# Patient Record
Sex: FEMALE | Race: WHITE | URBAN - METROPOLITAN AREA
[De-identification: names, ages, dates, MRNs, and addresses within clinical notes are randomized per-mention and may not be internally consistent; named-entity substitution may affect disease eponyms.]

---
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
## 2022-04-10 ENCOUNTER — HISTORICAL (OUTPATIENT)
Dept: ADMINISTRATIVE | Facility: HOSPITAL | Age: 22
End: 2022-04-10

## 2022-04-27 VITALS
HEIGHT: 65 IN | OXYGEN SATURATION: 100 % | BODY MASS INDEX: 18.66 KG/M2 | WEIGHT: 112 LBS | DIASTOLIC BLOOD PRESSURE: 76 MMHG | SYSTOLIC BLOOD PRESSURE: 113 MMHG

## 2022-09-16 ENCOUNTER — HISTORICAL (OUTPATIENT)
Dept: ADMINISTRATIVE | Facility: HOSPITAL | Age: 22
End: 2022-09-16

## 2024-12-23 ENCOUNTER — OFFICE VISIT (OUTPATIENT)
Dept: URGENT CARE | Facility: CLINIC | Age: 24
End: 2024-12-23
Payer: COMMERCIAL

## 2024-12-23 VITALS
DIASTOLIC BLOOD PRESSURE: 84 MMHG | WEIGHT: 120 LBS | OXYGEN SATURATION: 98 % | HEIGHT: 66 IN | TEMPERATURE: 99 F | HEART RATE: 104 BPM | RESPIRATION RATE: 17 BRPM | BODY MASS INDEX: 19.29 KG/M2 | SYSTOLIC BLOOD PRESSURE: 127 MMHG

## 2024-12-23 DIAGNOSIS — J06.9 UPPER RESPIRATORY TRACT INFECTION, UNSPECIFIED TYPE: Primary | ICD-10-CM

## 2024-12-23 DIAGNOSIS — J02.9 SORE THROAT: ICD-10-CM

## 2024-12-23 LAB
CTP QC/QA: YES
MOLECULAR STREP A: NEGATIVE
POC MOLECULAR INFLUENZA A AGN: NEGATIVE
POC MOLECULAR INFLUENZA B AGN: NEGATIVE
SARS-COV-2 AG RESP QL IA.RAPID: NEGATIVE

## 2024-12-23 RX ORDER — AZELASTINE 1 MG/ML
1 SPRAY, METERED NASAL 2 TIMES DAILY
Qty: 30 ML | Refills: 0 | Status: SHIPPED | OUTPATIENT
Start: 2024-12-23 | End: 2025-12-23

## 2024-12-23 RX ORDER — PREDNISONE 10 MG/1
10 TABLET ORAL 2 TIMES DAILY
Qty: 10 TABLET | Refills: 0 | Status: SHIPPED | OUTPATIENT
Start: 2024-12-23 | End: 2024-12-28

## 2024-12-23 NOTE — PATIENT INSTRUCTIONS
Sore throat  Body aches    Flu test negative  Strep negative  COVID negative    Monospot test:  Negative    Mycoplasma swab done in clinic, results pending, we will contact you when final report comes in and treat accordingly.    Medications sent to pharmacy.    At this time we will be treating you for an upper respiratory infection, URIs are commonly caused by a virus of some sort.  But we will be testing for mycoplasma and will prescribe an antibiotic if needed.  We will treat your symptoms for the time being.      Drink plenty of fluids.     Get plenty of rest.     Tylenol or Motrin as needed.     Over-the-counter Antihistamines such as Zyrtec, Claritin or Allegra.    Go to the ER with any significant change or worsening of symptoms.     Follow up with your primary care doctor.

## 2024-12-23 NOTE — PROGRESS NOTES
"Subjective:      Patient ID: Maria Del Carmen Lu is a 24 y.o. female.    Vitals:  height is 5' 6" (1.676 m) and weight is 54.4 kg (120 lb). Her temperature is 98.5 °F (36.9 °C). Her blood pressure is 127/84 and her pulse is 104. Her respiration is 17 and oxygen saturation is 98%.     Chief Complaint: URI     Patient is a 24 y.o. female who presents to urgent care with complaints of sore throat, congestion, BA x2 days. Alleviating factors include tylenol, dayquil with mild amount of relief. Patient denies SOB,N/V.      URI   Associated symptoms include sinus pain and a sore throat. Pertinent negatives include no abdominal pain, chest pain, coughing, diarrhea, neck pain, rash, vomiting or wheezing.     Constitution: Positive for chills, fatigue and generalized weakness.   HENT:  Positive for sinus pain, sinus pressure and sore throat. Negative for ear discharge, drooling, facial swelling and trouble swallowing.    Neck: Negative for neck pain and neck stiffness.   Cardiovascular:  Negative for chest pain.   Respiratory:  Negative for cough, bloody sputum, shortness of breath and wheezing.    Gastrointestinal:  Negative for abdominal pain, vomiting and diarrhea.   Skin:  Negative for rash.      Objective:        Previous History      Review of patient's allergies indicates:   Allergen Reactions    Penicillin Hives       History reviewed. No pertinent past medical history.  Current Outpatient Medications   Medication Instructions    azelastine (ASTELIN) 137 mcg, Nasal, 2 times daily    predniSONE (DELTASONE) 10 mg, Oral, 2 times daily     History reviewed. No pertinent surgical history.  Family History   Problem Relation Name Age of Onset    No Known Problems Mother      No Known Problems Father         Social History     Tobacco Use    Smoking status: Never    Smokeless tobacco: Never   Substance Use Topics    Alcohol use: Not Currently    Drug use: Never        Physical Exam      Vital Signs Reviewed   /84 (Patient " "Position: Sitting)   Pulse 104   Temp 98.5 °F (36.9 °C)   Resp 17   Ht 5' 6" (1.676 m)   Wt 54.4 kg (120 lb)   LMP 12/18/2024   SpO2 98%   BMI 19.37 kg/m²        Procedures    Procedures     Labs     Results for orders placed or performed in visit on 12/23/24   POCT Strep A, Molecular    Collection Time: 12/23/24 11:55 AM   Result Value Ref Range    Molecular Strep A, POC Negative Negative     Acceptable Yes    SARS Coronavirus 2 Antigen, POCT Manual Read    Collection Time: 12/23/24 12:01 PM   Result Value Ref Range    SARS Coronavirus 2 Antigen Negative Negative     Acceptable Yes    POCT Influenza A/B MOLECULAR    Collection Time: 12/23/24 12:03 PM   Result Value Ref Range    POC Molecular Influenza A Ag Negative Negative    POC Molecular Influenza B Ag Negative Negative     Acceptable Yes        Physical Exam   Constitutional: She appears well-developed.  Non-toxic appearance. She does not appear ill. No distress.   HENT:   Head: Atraumatic.   Ears:   Right Ear: Tympanic membrane, external ear and ear canal normal.   Left Ear: Tympanic membrane, external ear and ear canal normal.   Nose: No purulent discharge. Right sinus exhibits no maxillary sinus tenderness and no frontal sinus tenderness. Left sinus exhibits no maxillary sinus tenderness and no frontal sinus tenderness.   Mouth/Throat: Uvula is midline. Mucous membranes are moist. Oropharyngeal exudate and posterior oropharyngeal erythema present.      Comments:  bilateral swollen tonsils L>R   Eyes: Right eye exhibits no discharge. Left eye exhibits no discharge. Extraocular movement intact   Neck: Neck supple. No neck rigidity present.   Cardiovascular: Regular rhythm, normal heart sounds and normal pulses. Tachycardia present.   Pulmonary/Chest: Effort normal and breath sounds normal. No respiratory distress. She has no wheezes. She has no rhonchi. She has no rales.   Lymphadenopathy:     She has no " cervical adenopathy.   Neurological: She is alert.   Skin: Skin is warm, dry and not diaphoretic.   Psychiatric: Her behavior is normal.   Nursing note and vitals reviewed.      Assessment:     1. Upper respiratory tract infection, unspecified type    2. Sore throat        Plan:   Sore throat  Body aches    Flu test negative  Strep negative  COVID negative    Monospot test:  Negative    Mycoplasma swab done in clinic, results pending, we will contact you when final report comes in and treat accordingly.      Drink plenty of fluids.     Get plenty of rest.     Tylenol or Motrin as needed.   You may start over-the-counter antihistamines such as Allegra, Claritin or Zyrtec.    Go to the ER with any significant change or worsening of symptoms.  At but get that go ahead    If mycoplasma results come back positive, doxycycline is preferred.      Upper respiratory tract infection, unspecified type  -     POCT Influenza A/B MOLECULAR  -     POCT Strep A, Molecular  -     SARS Coronavirus 2 Antigen, POCT Manual Read    Sore throat  -     MYCOPLASMA BY PCR; Future; Expected date: 12/23/2024    Other orders  -     predniSONE (DELTASONE) 10 MG tablet; Take 1 tablet (10 mg total) by mouth 2 (two) times daily. for 5 days  Dispense: 10 tablet; Refill: 0  -     azelastine (ASTELIN) 137 mcg (0.1 %) nasal spray; 1 spray (137 mcg total) by Nasal route 2 (two) times daily.  Dispense: 30 mL; Refill: 0

## 2024-12-24 LAB — MYCOPLAS PCR (OHS): NEGATIVE
